# Patient Record
Sex: MALE | Race: WHITE | Employment: UNEMPLOYED | URBAN - METROPOLITAN AREA
[De-identification: names, ages, dates, MRNs, and addresses within clinical notes are randomized per-mention and may not be internally consistent; named-entity substitution may affect disease eponyms.]

---

## 2023-02-24 ENCOUNTER — OFFICE VISIT (OUTPATIENT)
Dept: FAMILY MEDICINE CLINIC | Age: 38
End: 2023-02-24

## 2023-02-24 VITALS
RESPIRATION RATE: 16 BRPM | DIASTOLIC BLOOD PRESSURE: 70 MMHG | SYSTOLIC BLOOD PRESSURE: 126 MMHG | HEIGHT: 72 IN | BODY MASS INDEX: 16.89 KG/M2 | HEART RATE: 80 BPM | WEIGHT: 124.7 LBS

## 2023-02-24 DIAGNOSIS — F41.9 ANXIETY AND DEPRESSION: Primary | ICD-10-CM

## 2023-02-24 DIAGNOSIS — F32.A ANXIETY AND DEPRESSION: Primary | ICD-10-CM

## 2023-02-24 DIAGNOSIS — F51.02 ADJUSTMENT INSOMNIA: ICD-10-CM

## 2023-02-24 PROCEDURE — 99213 OFFICE O/P EST LOW 20 MIN: CPT | Performed by: NURSE PRACTITIONER

## 2023-02-24 RX ORDER — TRAZODONE HYDROCHLORIDE 50 MG/1
50 TABLET ORAL NIGHTLY
Qty: 30 TABLET | Refills: 0 | Status: SHIPPED | OUTPATIENT
Start: 2023-02-24

## 2023-02-24 RX ORDER — DULOXETIN HYDROCHLORIDE 30 MG/1
30 CAPSULE, DELAYED RELEASE ORAL DAILY
Qty: 30 CAPSULE | Refills: 0 | Status: SHIPPED | OUTPATIENT
Start: 2023-02-24

## 2023-02-24 SDOH — ECONOMIC STABILITY: FOOD INSECURITY: WITHIN THE PAST 12 MONTHS, THE FOOD YOU BOUGHT JUST DIDN'T LAST AND YOU DIDN'T HAVE MONEY TO GET MORE.: NEVER TRUE

## 2023-02-24 SDOH — ECONOMIC STABILITY: INCOME INSECURITY: HOW HARD IS IT FOR YOU TO PAY FOR THE VERY BASICS LIKE FOOD, HOUSING, MEDICAL CARE, AND HEATING?: NOT HARD AT ALL

## 2023-02-24 SDOH — ECONOMIC STABILITY: FOOD INSECURITY: WITHIN THE PAST 12 MONTHS, YOU WORRIED THAT YOUR FOOD WOULD RUN OUT BEFORE YOU GOT MONEY TO BUY MORE.: NEVER TRUE

## 2023-02-24 SDOH — ECONOMIC STABILITY: HOUSING INSECURITY
IN THE LAST 12 MONTHS, WAS THERE A TIME WHEN YOU DID NOT HAVE A STEADY PLACE TO SLEEP OR SLEPT IN A SHELTER (INCLUDING NOW)?: NO

## 2023-02-24 ASSESSMENT — PATIENT HEALTH QUESTIONNAIRE - PHQ9
SUM OF ALL RESPONSES TO PHQ QUESTIONS 1-9: 2
2. FEELING DOWN, DEPRESSED OR HOPELESS: 1
SUM OF ALL RESPONSES TO PHQ QUESTIONS 1-9: 2
1. LITTLE INTEREST OR PLEASURE IN DOING THINGS: 1
SUM OF ALL RESPONSES TO PHQ9 QUESTIONS 1 & 2: 2

## 2023-02-24 NOTE — PROGRESS NOTES
Subjective:      Patient ID: Fabrizio Eaton 1985 is a 40 y.o. male here for evaluation. Chief Complaint   Patient presents with    Establish Care     Last seen 2014    Anxiety    Depression       Re-establish. Last seen 2014. No new medical hx. No new surgical hx. No new medications. Patient Active Problem List   Diagnosis    GERD (gastroesophageal reflux disease)    Borderline abnormal TFTs    HOLLIE (generalized anxiety disorder)    Smoking 1/2 pack a day or less       No insurance. Here to discuss anxiety and depression. Stop smoking marijuana - was self treating and doing well. Wanting to get away from smoking ascept. Since stopping anxiety and depression has elevated. Insomnia. Off and on with sleep. Working is more difficult.        Vitals:    02/24/23 1003   BP: 126/70   Pulse: 80   Resp: 16        BP Readings from Last 3 Encounters:   02/24/23 126/70   02/03/14 128/74   07/29/13 124/76         No results found for: LABA1C  No results found for: EAG    No components found for: CHLPL  No results found for: TRIG  No results found for: HDL  No results found for: LDLCALC  No results found for: LABVLDL      Chemistry        Component Value Date/Time     04/27/2020 0204    K 4.1 04/27/2020 0204     04/27/2020 0204    CO2 25 04/27/2020 0204    BUN 38 (H) 04/27/2020 0204    CREATININE 1.07 04/27/2020 0204        Component Value Date/Time    CALCIUM 9.50 04/27/2020 0204    ALKPHOS 55 04/27/2020 0204    AST 19 04/27/2020 0204    ALT 18 04/27/2020 0204    BILITOT 0.5 04/27/2020 0204            Lab Results   Component Value Date    TSH 0.331 (L) 08/28/2012       Lab Results   Component Value Date    WBC 10.1 04/27/2020    HGB 13.9 04/27/2020    HCT 41.0 04/27/2020    MCV 90.3 04/27/2020     04/27/2020         Health Maintenance   Topic Date Due    COVID-19 Vaccine (1) Never done    Varicella vaccine (1 of 2 - 2-dose childhood series) Never done    Pneumococcal 0-64 years Vaccine (1 - PCV) Never done    HIV screen  Never done    Hepatitis C screen  Never done    DTaP/Tdap/Td vaccine (1 - Tdap) Never done    Flu vaccine (1) Never done    Depression Monitoring  02/24/2024    Hepatitis A vaccine  Aged Out    Hib vaccine  Aged Out    Meningococcal (ACWY) vaccine  Aged Out         There is no immunization history on file for this patient. Review of Systems   Constitutional:  Negative for chills and fever. HENT: Negative. Respiratory:  Negative for cough and shortness of breath. Cardiovascular:  Negative for chest pain. Gastrointestinal:  Negative for abdominal pain and nausea. Skin:  Negative for rash. Neurological:  Negative for dizziness, light-headedness and headaches. Psychiatric/Behavioral:  Positive for behavioral problems, dysphoric mood and sleep disturbance. The patient is nervous/anxious. Objective:   Physical Exam  Constitutional:       General: He is not in acute distress. Eyes:      Pupils: Pupils are equal, round, and reactive to light. Cardiovascular:      Rate and Rhythm: Normal rate and regular rhythm. Heart sounds: No murmur heard. Pulmonary:      Effort: Pulmonary effort is normal.      Breath sounds: Normal breath sounds. No wheezing. Abdominal:      General: Bowel sounds are normal. There is no distension. Palpations: Abdomen is soft. Tenderness: There is no abdominal tenderness. Musculoskeletal:         General: No tenderness. Normal range of motion. Cervical back: Normal range of motion and neck supple. Skin:     General: Skin is warm and dry. Findings: No rash. Psychiatric:         Attention and Perception: He is inattentive. Mood and Affect: Mood is anxious and depressed. Behavior: Behavior is slowed and withdrawn. Behavior is not agitated or aggressive. Thought Content: Thought content is paranoid. Judgment: Judgment is impulsive.         Assessment:       Diagnosis Orders   1. Anxiety and depression  DULoxetine (CYMBALTA) 30 MG extended release capsule      2. Adjustment insomnia  traZODone (DESYREL) 50 MG tablet              Plan:      Cymbalta 30 mg Daily  Trazodone 50 mg HS for slee  Hold on labs due to lack of insurance  Call with update in 1 month    Current Outpatient Medications   Medication Sig Dispense Refill    DULoxetine (CYMBALTA) 30 MG extended release capsule Take 1 capsule by mouth daily 30 capsule 0    traZODone (DESYREL) 50 MG tablet Take 1 tablet by mouth nightly 30 tablet 0     No current facility-administered medications for this visit.

## 2023-02-24 NOTE — PATIENT INSTRUCTIONS
You may receive a survey about your visit with us today. The feedback from our patients helps us identify what is working well and where the service to all patients can be enhanced. Thank you! Tobacco Cessation Programs     Telephonic behavior modification  1-800-QUIT-NOW (228-0821)  Counseling service for those who are ready to quit using tobacco.    Available for uninsured [place] residents, Medicaid recipients, pregnant women, or patients whose health plans or employers are members of the [address] [place] [address] behavior modification  http://[place]. [employer]. org  Online support program to help patients through each step of the quitting process. Available 24 hours a day 7 days a week. Provides up to date researched based tool, step-by-step guides, and motivational messages. Online behavior modification  www.lungusa.org/stop-smoking/how-to-quit  HelpLine: 1-800-LUNG-[place] (907-5072)  Email questions to:  Doreen@[employer]. [employer]   Website offers resources to help tobacco users figure out their reasons for quitting and then take the big step of quitting for good. Hypnosis  Location: [address] [place] [address]  Contact: Brayden Hampton, PhD at [phone]  Hypnosis for tobacco cessation  Cost $225 for the initial session and $175 for each session afterwards. Most patients require 6-8 sessions. There is the option to submit through the patients insurance. Hypnosis and behavior modification  Location: [place] [address],  [facility] [address], [place]  Contact: Trenton Saravia, PhD at [phone]  Counseling and hypnosis for nicotine addition  Cost: For uninsured patients:  Please call above phone number  Cost for insured patients depends on patients insurance plan.     Behavior modification  Location: [facility], [address] [facility] [address]. Almita Vega [address]: [phone]  Services include four one-on-one appointments between the patient and a respiratory therapist.  The four appointments span over three weeks. The respiratory therapist schedules one of the appointments to occur 48 hours after the patients quit date. Cost $100 total for the four sessions.   Tobacco cessation products are not included in the cost and are not provided by [facility].

## 2023-02-25 ASSESSMENT — ENCOUNTER SYMPTOMS
NAUSEA: 0
SHORTNESS OF BREATH: 0
COUGH: 0
ABDOMINAL PAIN: 0